# Patient Record
Sex: MALE | Race: WHITE | NOT HISPANIC OR LATINO | URBAN - METROPOLITAN AREA
[De-identification: names, ages, dates, MRNs, and addresses within clinical notes are randomized per-mention and may not be internally consistent; named-entity substitution may affect disease eponyms.]

---

## 2024-05-30 ENCOUNTER — OFFICE (OUTPATIENT)
Dept: URBAN - METROPOLITAN AREA CLINIC 9 | Facility: CLINIC | Age: 59
End: 2024-05-30

## 2024-05-30 VITALS
HEART RATE: 91 BPM | DIASTOLIC BLOOD PRESSURE: 61 MMHG | OXYGEN SATURATION: 97 % | SYSTOLIC BLOOD PRESSURE: 127 MMHG | HEIGHT: 75 IN

## 2024-05-30 DIAGNOSIS — F11.20 OPIOID DEPENDENCE, UNCOMPLICATED: ICD-10-CM

## 2024-05-30 DIAGNOSIS — I50.9 HEART FAILURE, UNSPECIFIED: ICD-10-CM

## 2024-05-30 DIAGNOSIS — R19.7 DIARRHEA, UNSPECIFIED: ICD-10-CM

## 2024-05-30 DIAGNOSIS — Z79.02 LONG TERM (CURRENT) USE OF ANTITHROMBOTICS/ANTIPLATELETS: ICD-10-CM

## 2024-05-30 PROCEDURE — 99204 OFFICE O/P NEW MOD 45 MIN: CPT | Performed by: STUDENT IN AN ORGANIZED HEALTH CARE EDUCATION/TRAINING PROGRAM

## 2024-05-30 RX ORDER — SODIUM PICOSULFATE, MAGNESIUM OXIDE, AND ANHYDROUS CITRIC ACID 12; 3.5; 1 G/175ML; G/175ML; MG/175ML
LIQUID ORAL
Qty: 1 | Refills: 0 | Status: ACTIVE
Start: 2024-05-30

## 2024-05-30 NOTE — SERVICEHPINOTES
Mr. Bairon Guzman is a 59-year-old white male with past medical history of congestive heart failure with ejection fraction of 30 35%, peripheral vascular disease on Eliquis and Plavix.  Who presents to gastro 1 for positive for: Guard test.
br
br clinic visit 05/30/2024: 
br Patient reports that he has 1 bowel movement per day.  No blood in the stool or black tarry stool.  last colonoscopy was over 10 years ago.  Recently had a positive: Guard test.  No previous abdominal surgery.  No family history of GI malignancies or liver disease.  He has previous stents in his right groin.  He smokes 1 pack per day for many years.  He takes hydrocodone for chronic pain 3 times a day.  He sees cardiologist Dr. Vishnu Muir for his anticoagulation.  His last echocardiogram was done just a couple weeks ago but I do not have those results.  He was recently in the hospital the last couple of months with an ejection fraction of 30-35%.  He is here with his wife today.  He takes Plavix and Eliquis.

## 2024-05-30 NOTE — SERVICENOTES
given the patient's multiple comorbidities will request cardiac clearance and do his colonoscopy at the hospital for positive: Guarded.  Discussed with him the risks benefits of the procedure.  Will request cardiac clearance from Dr. Muir to hold Eliquis for 2 days and Plavix for 5 days prior to the procedure.  Discussed with the patient the risks and benefits of the procedure and he agrees to proceed.  All questions addressed.  Follow up as needed after colonoscopy.